# Patient Record
Sex: FEMALE | Race: WHITE | NOT HISPANIC OR LATINO | Employment: OTHER | URBAN - METROPOLITAN AREA
[De-identification: names, ages, dates, MRNs, and addresses within clinical notes are randomized per-mention and may not be internally consistent; named-entity substitution may affect disease eponyms.]

---

## 2023-03-08 LAB
ALANINE AMINOTRANSFERASE (SGPT) (U/L) IN SER/PLAS: 14 U/L (ref 7–45)
ALBUMIN (G/DL) IN SER/PLAS: 4.3 G/DL (ref 3.4–5)
ALKALINE PHOSPHATASE (U/L) IN SER/PLAS: 83 U/L (ref 33–136)
ANION GAP IN SER/PLAS: 11 MMOL/L (ref 10–20)
ASPARTATE AMINOTRANSFERASE (SGOT) (U/L) IN SER/PLAS: 17 U/L (ref 9–39)
BILIRUBIN TOTAL (MG/DL) IN SER/PLAS: 0.7 MG/DL (ref 0–1.2)
CALCIUM (MG/DL) IN SER/PLAS: 9.2 MG/DL (ref 8.6–10.3)
CARBON DIOXIDE, TOTAL (MMOL/L) IN SER/PLAS: 32 MMOL/L (ref 21–32)
CHLORIDE (MMOL/L) IN SER/PLAS: 102 MMOL/L (ref 98–107)
CHOLESTEROL (MG/DL) IN SER/PLAS: 165 MG/DL (ref 0–199)
CHOLESTEROL IN HDL (MG/DL) IN SER/PLAS: 56 MG/DL
CHOLESTEROL/HDL RATIO: 2.9
CREATININE (MG/DL) IN SER/PLAS: 0.82 MG/DL (ref 0.5–1.05)
GFR FEMALE: 78 ML/MIN/1.73M2
GLUCOSE (MG/DL) IN SER/PLAS: 130 MG/DL (ref 74–99)
LDL: 94 MG/DL (ref 0–99)
POTASSIUM (MMOL/L) IN SER/PLAS: 3.9 MMOL/L (ref 3.5–5.3)
PROTEIN TOTAL: 6.8 G/DL (ref 6.4–8.2)
SODIUM (MMOL/L) IN SER/PLAS: 141 MMOL/L (ref 136–145)
TRIGLYCERIDE (MG/DL) IN SER/PLAS: 75 MG/DL (ref 0–149)
UREA NITROGEN (MG/DL) IN SER/PLAS: 15 MG/DL (ref 6–23)
VLDL: 15 MG/DL (ref 0–40)

## 2024-01-07 ENCOUNTER — HOSPITAL ENCOUNTER (OUTPATIENT)
Dept: RADIOLOGY | Facility: HOSPITAL | Age: 69
Discharge: HOME | End: 2024-01-07
Payer: MEDICARE

## 2024-01-07 DIAGNOSIS — J18.9 PNEUMONIA, UNSPECIFIED ORGANISM: ICD-10-CM

## 2024-01-07 PROCEDURE — 71046 X-RAY EXAM CHEST 2 VIEWS: CPT

## 2024-01-07 PROCEDURE — 71046 X-RAY EXAM CHEST 2 VIEWS: CPT | Performed by: RADIOLOGY

## 2024-01-17 PROBLEM — E87.6 HYPOKALEMIA: Status: ACTIVE | Noted: 2024-01-17

## 2024-01-17 PROBLEM — I80.9 PHLEBITIS: Status: ACTIVE | Noted: 2024-01-17

## 2024-01-17 PROBLEM — H81.10 BENIGN PAROXYSMAL POSITIONAL VERTIGO: Status: ACTIVE | Noted: 2024-01-17

## 2024-01-17 PROBLEM — R01.1 CARDIAC MURMUR: Status: ACTIVE | Noted: 2018-10-22

## 2024-01-17 PROBLEM — I65.29 OCCLUSION AND STENOSIS OF UNSPECIFIED CAROTID ARTERY: Status: ACTIVE | Noted: 2023-03-01

## 2024-01-17 PROBLEM — I25.10 CAD (CORONARY ATHEROSCLEROTIC DISEASE): Status: ACTIVE | Noted: 2018-04-16

## 2024-01-17 PROBLEM — E78.2 MIXED HYPERLIPIDEMIA: Status: ACTIVE | Noted: 2024-01-17

## 2024-01-17 PROBLEM — R00.2 PALPITATIONS: Status: ACTIVE | Noted: 2024-01-17

## 2024-01-17 PROBLEM — I65.23 BILATERAL CAROTID ARTERY STENOSIS: Status: ACTIVE | Noted: 2024-01-17

## 2024-01-17 PROBLEM — I10 ESSENTIAL HYPERTENSION: Status: ACTIVE | Noted: 2024-01-17

## 2024-01-17 PROBLEM — I51.7 LVH (LEFT VENTRICULAR HYPERTROPHY): Status: ACTIVE | Noted: 2024-01-17

## 2024-01-17 RX ORDER — LOSARTAN POTASSIUM 50 MG/1
50 TABLET ORAL
COMMUNITY
Start: 2023-11-01 | End: 2024-03-14 | Stop reason: SDUPTHER

## 2024-01-17 RX ORDER — ROSUVASTATIN CALCIUM 40 MG/1
40 TABLET, COATED ORAL DAILY
COMMUNITY
Start: 2023-11-01 | End: 2024-01-30

## 2024-01-17 RX ORDER — ESOMEPRAZOLE MAGNESIUM 40 MG/1
40 CAPSULE, DELAYED RELEASE ORAL
COMMUNITY

## 2024-01-17 RX ORDER — AMITRIPTYLINE HYDROCHLORIDE 10 MG/1
10 TABLET, FILM COATED ORAL NIGHTLY
COMMUNITY
End: 2024-03-14 | Stop reason: WASHOUT

## 2024-01-17 RX ORDER — LANOLIN ALCOHOL/MO/W.PET/CERES
400 CREAM (GRAM) TOPICAL 2 TIMES DAILY
COMMUNITY

## 2024-01-17 RX ORDER — IBUPROFEN 200 MG
200 TABLET ORAL EVERY 6 HOURS
COMMUNITY

## 2024-01-17 RX ORDER — HYDROCHLOROTHIAZIDE 25 MG/1
25 TABLET ORAL DAILY
COMMUNITY
Start: 2022-08-22 | End: 2024-01-30

## 2024-01-17 RX ORDER — PROPRANOLOL HYDROCHLORIDE 60 MG/1
1 CAPSULE, EXTENDED RELEASE ORAL DAILY
COMMUNITY
Start: 2017-09-21 | End: 2024-01-30

## 2024-01-17 RX ORDER — ALBUTEROL SULFATE 90 UG/1
2 AEROSOL, METERED RESPIRATORY (INHALATION) EVERY 6 HOURS PRN
COMMUNITY

## 2024-01-17 RX ORDER — POTASSIUM CHLORIDE 750 MG/1
10 TABLET, FILM COATED, EXTENDED RELEASE ORAL DAILY
COMMUNITY

## 2024-01-17 RX ORDER — NAPROXEN SODIUM 220 MG/1
81 TABLET, FILM COATED ORAL DAILY
COMMUNITY

## 2024-01-17 RX ORDER — MECLIZINE HYDROCHLORIDE 25 MG/1
25 TABLET ORAL 3 TIMES DAILY PRN
COMMUNITY
End: 2024-03-14 | Stop reason: WASHOUT

## 2024-01-17 RX ORDER — SUMATRIPTAN SUCCINATE 100 MG/1
100 TABLET ORAL
COMMUNITY

## 2024-01-30 DIAGNOSIS — E78.2 MIXED HYPERLIPIDEMIA: ICD-10-CM

## 2024-01-30 DIAGNOSIS — I10 ESSENTIAL HYPERTENSION: ICD-10-CM

## 2024-01-30 DIAGNOSIS — I25.10 ATHEROSCLEROSIS OF NATIVE CORONARY ARTERY OF NATIVE HEART WITHOUT ANGINA PECTORIS: ICD-10-CM

## 2024-01-30 RX ORDER — HYDROCHLOROTHIAZIDE 25 MG/1
25 TABLET ORAL DAILY
Qty: 90 TABLET | Refills: 3 | Status: SHIPPED | OUTPATIENT
Start: 2024-01-30 | End: 2024-03-14 | Stop reason: SDUPTHER

## 2024-01-30 RX ORDER — PROPRANOLOL HYDROCHLORIDE 60 MG/1
60 CAPSULE, EXTENDED RELEASE ORAL DAILY
Qty: 90 CAPSULE | Refills: 3 | Status: SHIPPED | OUTPATIENT
Start: 2024-01-30

## 2024-01-30 RX ORDER — ROSUVASTATIN CALCIUM 40 MG/1
TABLET, COATED ORAL
Qty: 90 TABLET | Refills: 3 | Status: SHIPPED | OUTPATIENT
Start: 2024-01-30 | End: 2024-03-14 | Stop reason: SDUPTHER

## 2024-01-30 NOTE — TELEPHONE ENCOUNTER
Received request for prescription refills for patient.   Patient follows with Dr. Dykes, DO     Last OV 3/9/2023  Next OV 3/14/2024    Pended for signing and sent to provider

## 2024-03-09 ENCOUNTER — LAB (OUTPATIENT)
Dept: LAB | Facility: LAB | Age: 69
End: 2024-03-09
Payer: MEDICARE

## 2024-03-09 DIAGNOSIS — I51.7 CARDIOMEGALY: ICD-10-CM

## 2024-03-09 DIAGNOSIS — E78.2 MIXED HYPERLIPIDEMIA: ICD-10-CM

## 2024-03-09 DIAGNOSIS — I10 ESSENTIAL (PRIMARY) HYPERTENSION: Primary | ICD-10-CM

## 2024-03-09 LAB
ALBUMIN SERPL BCP-MCNC: 4.2 G/DL (ref 3.4–5)
ALP SERPL-CCNC: 90 U/L (ref 33–136)
ALT SERPL W P-5'-P-CCNC: 11 U/L (ref 7–45)
ANION GAP SERPL CALC-SCNC: 11 MMOL/L (ref 10–20)
AST SERPL W P-5'-P-CCNC: 13 U/L (ref 9–39)
BILIRUB SERPL-MCNC: 0.6 MG/DL (ref 0–1.2)
BUN SERPL-MCNC: 13 MG/DL (ref 6–23)
CALCIUM SERPL-MCNC: 9.1 MG/DL (ref 8.6–10.3)
CHLORIDE SERPL-SCNC: 102 MMOL/L (ref 98–107)
CHOLEST SERPL-MCNC: 176 MG/DL (ref 0–199)
CHOLESTEROL/HDL RATIO: 3.1
CO2 SERPL-SCNC: 31 MMOL/L (ref 21–32)
CREAT SERPL-MCNC: 0.7 MG/DL (ref 0.5–1.05)
EGFRCR SERPLBLD CKD-EPI 2021: >90 ML/MIN/1.73M*2
GLUCOSE SERPL-MCNC: 110 MG/DL (ref 74–99)
HDLC SERPL-MCNC: 56.2 MG/DL
LDLC SERPL CALC-MCNC: 101 MG/DL
NON HDL CHOLESTEROL: 120 MG/DL (ref 0–149)
POTASSIUM SERPL-SCNC: 3.7 MMOL/L (ref 3.5–5.3)
PROT SERPL-MCNC: 6.5 G/DL (ref 6.4–8.2)
SODIUM SERPL-SCNC: 140 MMOL/L (ref 136–145)
TRIGL SERPL-MCNC: 95 MG/DL (ref 0–149)
VLDL: 19 MG/DL (ref 0–40)

## 2024-03-09 PROCEDURE — 36415 COLL VENOUS BLD VENIPUNCTURE: CPT

## 2024-03-09 PROCEDURE — 80053 COMPREHEN METABOLIC PANEL: CPT

## 2024-03-09 PROCEDURE — 80061 LIPID PANEL: CPT

## 2024-03-14 ENCOUNTER — OFFICE VISIT (OUTPATIENT)
Dept: CARDIOLOGY | Facility: CLINIC | Age: 69
End: 2024-03-14
Payer: MEDICARE

## 2024-03-14 VITALS
HEIGHT: 66 IN | WEIGHT: 191 LBS | HEART RATE: 72 BPM | BODY MASS INDEX: 30.7 KG/M2 | SYSTOLIC BLOOD PRESSURE: 138 MMHG | DIASTOLIC BLOOD PRESSURE: 78 MMHG

## 2024-03-14 DIAGNOSIS — Z78.9 NEVER SMOKED CIGARETTES: ICD-10-CM

## 2024-03-14 DIAGNOSIS — I10 ESSENTIAL HYPERTENSION: Primary | ICD-10-CM

## 2024-03-14 DIAGNOSIS — I65.29 OCCLUSION AND STENOSIS OF UNSPECIFIED CAROTID ARTERY: ICD-10-CM

## 2024-03-14 DIAGNOSIS — E78.2 MIXED HYPERLIPIDEMIA: ICD-10-CM

## 2024-03-14 DIAGNOSIS — I25.10 ATHEROSCLEROSIS OF NATIVE CORONARY ARTERY OF NATIVE HEART WITHOUT ANGINA PECTORIS: ICD-10-CM

## 2024-03-14 DIAGNOSIS — I51.7 LVH (LEFT VENTRICULAR HYPERTROPHY): ICD-10-CM

## 2024-03-14 DIAGNOSIS — I47.19 PAT (PAROXYSMAL ATRIAL TACHYCARDIA) (CMS-HCC): ICD-10-CM

## 2024-03-14 DIAGNOSIS — I65.23 BILATERAL CAROTID ARTERY STENOSIS: ICD-10-CM

## 2024-03-14 PROCEDURE — 1159F MED LIST DOCD IN RCRD: CPT | Performed by: INTERNAL MEDICINE

## 2024-03-14 PROCEDURE — 3008F BODY MASS INDEX DOCD: CPT | Performed by: INTERNAL MEDICINE

## 2024-03-14 PROCEDURE — 3075F SYST BP GE 130 - 139MM HG: CPT | Performed by: INTERNAL MEDICINE

## 2024-03-14 PROCEDURE — 1036F TOBACCO NON-USER: CPT | Performed by: INTERNAL MEDICINE

## 2024-03-14 PROCEDURE — 99214 OFFICE O/P EST MOD 30 MIN: CPT | Performed by: INTERNAL MEDICINE

## 2024-03-14 PROCEDURE — 3078F DIAST BP <80 MM HG: CPT | Performed by: INTERNAL MEDICINE

## 2024-03-14 RX ORDER — ROSUVASTATIN CALCIUM 40 MG/1
40 TABLET, COATED ORAL DAILY
Qty: 90 TABLET | Refills: 3 | Status: SHIPPED | OUTPATIENT
Start: 2024-03-14 | End: 2025-03-14

## 2024-03-14 RX ORDER — LOSARTAN POTASSIUM 50 MG/1
TABLET ORAL
Qty: 135 TABLET | Refills: 3 | Status: SHIPPED | OUTPATIENT
Start: 2024-03-14

## 2024-03-14 RX ORDER — HYDROCHLOROTHIAZIDE 25 MG/1
25 TABLET ORAL DAILY
Qty: 90 TABLET | Refills: 3 | Status: SHIPPED | OUTPATIENT
Start: 2024-03-14

## 2024-03-14 RX ORDER — AMITRIPTYLINE HYDROCHLORIDE 25 MG/1
25 TABLET, FILM COATED ORAL NIGHTLY
COMMUNITY

## 2024-03-14 NOTE — PROGRESS NOTES
CARDIOLOGY OFFICE VISIT      CHIEF COMPLAINT  Chief Complaint   Patient presents with    Follow-up     1 YEAR        HISTORY OF PRESENT ILLNESS  Patient is a 68-year-old  female with past medical history significant for hypercholesterolemia, carotid artery disease, left ventricular hypertrophy, paroxysmal atrial tachycardia who presents for follow-up cardiovascular care.      Patient has mild palpitations 2 times a week lasting seconds in duration.  Denies chest pain, dyspnea, nausea, vomiting.  Chronic mild dizziness without near syncope or mikayla syncope.  Denies edema or claudicate of symptoms.  Patient had bronchitis twice treated with antibiotics and metered-dose inhaler.  She had a right upper lobe 5 mm nodule on chest x-ray and will be following up with [facility] pulmonology.  Patient has not been exercising.        REVIEW OF SYSTEMS: Negative, noncontributory or as previously mentioned x12 systems..      PAST MEDICAL HISTORY  1. Hypercholesterolemia.  2. Phlebitis.  3. Positional vertigo.  4. Depression.  5. Migraine headaches.      PAST SURGICAL HISTORY   1. Cholecystectomy.   2.  x2.   3. Tonsils and adenoidectomy.   4. Heel spur surgery.      FAMILY HISTORY: Mom has a history of cerebrovascular accident at age 55, status  post carotid endarterectomy, coronary artery disease status post angioplasty.      SOCIAL HISTORY: Denies tobacco use. Occasional alcohol use.      ALLERGIES: Contrast media, intolerance to Lipitor.      I personally reviewed EKG 2020: Normal sinus rhythm     ASSESSMENT   Hypertension   Left ventricular hypertrophy  Paroxysmal atrial tachycardia/premature atrial contractions  Hypercholesterolemia.   Fatty liver-declined liver biopsy  Arizmendi's esophagus  Hiatal hernia  Vertigo  Family history of premature atherosclerotic disease.   Contrast allergy.      RECOMMENDATIONS   Patient appears to be stable from cardiac standpoint. No symptoms of angina. No evidence of heart  failure. Blood pressure under good control. Cholesterol mildly elevated, glucose mildly elevated. Emphasized importance of low sodium diet less than 2000 g sodium per day. Recommend exercise program 30 minutes, along with diet and weight loss. Maintain blood pressure diary prior to office visits.  Consider increasing losartan.  Consider adding Zetia. Follow-up in one year. Check Carotid ultrasound,CMP, lipid profile at that time.     Please excuse any errors in grammar or translation related to this dictation. Voice recognition software was utilized to prepare this document.     Recent Cardiovascular Testing:  The following results have been reviewed and updated.   Carotid Duplex 3/1/23  1.SAMIR < 50%-69%  2.LICA <50%-69%     Renal artery ultrasound 8/11/20  1.Bilateral renal arteries no evidence of hemodynamically significant stenosis.     Labs 3/9/24  , TG 95, HDL 56,         24 hr Holter 8/6/20  1.Symptoms of palpitations correlated with NSR  2.Rare PVC, PAC  3.No significant dysrhythmias.     CT Angio coronary arteries 8/11/20  1.Mild diffuce CAD without significante stenosis.  2. Coronary calcium score zero     Abd u/s: 2/16/21  1. Abdominal aorta, no evidence of aneurysm.       VITALS  Vitals:    03/14/24 0708   BP: 138/78   Pulse: 72     Wt Readings from Last 4 Encounters:   03/09/23 85.7 kg (189 lb)   03/10/22 83.9 kg (185 lb)       PHYSICAL EXAM:  GENERAL:  Well developed, well nourished, in no acute distress.  CHEST:  Symmetric and nontender.  NEURO/PSYCH:  Alert and oriented times three with approppriate behavior and responses.  NECK:  Supple, no JVD, no bruit.  LUNGS:  Clear to auscultation bilaterally, normal respiratory effort.  HEART:  Rate and rhythm regular with no evident murmur, no gallop appreciated.                   There are no rubs, clicks or heaves.  EXTREMITIES:  Warm with good color, no clubbing or cyanosis.  There is no edema noted.  PERIPHERAL VASCULAR:  Pulses present and  "equally palpable; 2+ throughout.    ASSESSMENT AND PLAN  Diagnoses and all orders for this visit:  Essential hypertension  -     Comprehensive metabolic panel; Future  -     hydroCHLOROthiazide (HYDRODiuril) 25 mg tablet; Take 1 tablet (25 mg) by mouth once daily.  -     losartan (Cozaar) 50 mg tablet; Take 1 tablet in the morning and 1/2 tablet in the evening  LVH (left ventricular hypertrophy)  -     Comprehensive metabolic panel; Future  PAT (paroxysmal atrial tachycardia)  Mixed hyperlipidemia  -     Lipid panel; Future  -     rosuvastatin (Crestor) 40 mg tablet; Take 1 tablet (40 mg) by mouth once daily.  BMI 30.0-30.9,adult  Never smoked cigarettes  Occlusion and stenosis of unspecified carotid artery  Bilateral carotid artery stenosis  -     Vascular US carotid artery duplex bilateral; Future  Atherosclerosis of native coronary artery of native heart without angina pectoris  -     hydroCHLOROthiazide (HYDRODiuril) 25 mg tablet; Take 1 tablet (25 mg) by mouth once daily.  -     rosuvastatin (Crestor) 40 mg tablet; Take 1 tablet (40 mg) by mouth once daily.      Past Medical History  No past medical history on file.    Social History  Social History     Tobacco Use    Smoking status: Never    Smokeless tobacco: Never   Substance Use Topics    Alcohol use: Yes     Comment: social    Drug use: Never       Family History     Family History   Problem Relation Name Age of Onset    Hypertension Mother      Stroke Mother  55    Other (carotid endarterectomy) Mother      Coronary artery disease Mother      Other (Angioplasty) Mother      Hypertension Father      Heart failure Father      Other (Abdominal aortic aneurysm) Father      Emphysema Father          Allergies:  Allergies   Allergen Reactions    Oxycodone-Acetaminophen Nausea Only, Other and Nausea/vomiting     \"lightheaded\"    Nausea    Atorvastatin Myalgia    Citalopram Unknown and Other    Fluoxetine Unknown    Iodinated Contrast Media Unknown    Serotonin " "5ht-3 Antagonists Other        Outpatient Medications:  Current Outpatient Medications   Medication Instructions    albuterol 90 mcg/actuation inhaler 2 puffs, inhalation, Every 6 hours PRN    Alpha tocopherol (VITAMIN E) 200 Units, oral, Daily    amitriptyline (ELAVIL) 10 mg, oral, Nightly    aspirin 81 mg, oral, Daily    ergocalciferol, vitamin D2, (VITAMIN D2 ORAL) 1 tablet, oral, Daily    esomeprazole (NEXIUM) 40 mg, oral, Daily before breakfast    hydroCHLOROthiazide (HYDRODIURIL) 25 mg, oral, Daily    ibuprofen 200 mg, oral, Every 6 hours    losartan (COZAAR) 50 mg, oral, Take 1 tablet in the morning and 1/2 tablet in the evening    magnesium oxide (MAGOX) 400 mg, oral, 2 times daily    meclizine (ANTIVERT) 25 mg, oral, 3 times daily PRN    potassium chloride CR 10 mEq ER tablet 10 mEq, oral, Daily    propranolol LA (INDERAL LA) 60 mg, oral, Daily    rosuvastatin (Crestor) 40 mg tablet TAKE 1 TABLET DAILY IN THE EVENING (INCREASE IN DOSAGE)    SUMAtriptan (IMITREX) 100 mg, oral, Take 1/2 to 1 tablet at first s/s migraine.  May repeat in 2 hours. Max 200 mg in 24 hours        Recent Lab Results:    CMP:    Lab Results   Component Value Date     03/09/2024    K 3.7 03/09/2024     03/09/2024    CO2 31 03/09/2024    BUN 13 03/09/2024    CREATININE 0.70 03/09/2024    GLUCOSE 110 (H) 03/09/2024    CALCIUM 9.1 03/09/2024       Magnesium:    No results found for: \"MG\"    Lipid Profile:    Lab Results   Component Value Date    TRIG 95 03/09/2024    HDL 56.2 03/09/2024    LDLCALC 101 (H) 03/09/2024       TSH:    Lab Results   Component Value Date    TSH 1.60 07/31/2020       BNP:   No results found for: \"BNP\"     PT/INR:    No results found for: \"PROTIME\", \"INR\"    HgBA1c:    No results found for: \"HGBA1C\"    BMP:  Lab Results   Component Value Date     03/09/2024     03/08/2023     03/10/2022     06/10/2021    K 3.7 03/09/2024    K 3.9 03/08/2023    K 4.0 03/10/2022    K 3.5 " "06/10/2021     03/09/2024     03/08/2023    CL 99 03/10/2022    CL 99 06/10/2021    CO2 31 03/09/2024    CO2 32 03/08/2023    CO2 33 (H) 03/10/2022    CO2 33 (H) 06/10/2021    BUN 13 03/09/2024    BUN 15 03/08/2023    BUN 17 03/10/2022    BUN 17 06/10/2021    CREATININE 0.70 03/09/2024    CREATININE 0.82 03/08/2023    CREATININE 0.76 03/10/2022    CREATININE 0.80 06/10/2021       CBC:  Lab Results   Component Value Date    WBC 7.1 07/31/2020    RBC 4.26 07/31/2020    HGB 13.4 07/31/2020    HCT 39.4 07/31/2020    MCV 92 07/31/2020    MCHC 34.0 07/31/2020    RDW 12.7 07/31/2020     07/31/2020       Cardiac Enzymes:    No results found for: \"TROPHS\"    Hepatic Function Panel:    Lab Results   Component Value Date    ALKPHOS 90 03/09/2024    ALT 11 03/09/2024    AST 13 03/09/2024    PROT 6.5 03/09/2024    BILITOT 0.6 03/09/2024           Chris Dykes, DO        "

## 2024-03-14 NOTE — PATIENT INSTRUCTIONS
BP INSTRUCTIONS GIVEN    CAROTID ULTRASOUND IN 1 YEAR    FOLLOW UP IN 1 YEAR  OBTAIN LAB WORK PRIOR TO THIS VISIT, THIS WILL BE FASTING

## 2025-03-04 ENCOUNTER — TELEPHONE (OUTPATIENT)
Dept: CARDIOLOGY | Facility: CLINIC | Age: 70
End: 2025-03-04
Payer: MEDICARE

## 2025-03-04 NOTE — TELEPHONE ENCOUNTER
Called patient to reschedule Carotid scheduled for 03/05 @ 7:15 am due to it being scheduled in the wrong room. Moved pt to 8:15 am instead. Left  with appointment details.

## 2025-03-05 ENCOUNTER — APPOINTMENT (OUTPATIENT)
Dept: CARDIOLOGY | Facility: CLINIC | Age: 70
End: 2025-03-05
Payer: MEDICARE

## 2025-03-13 ENCOUNTER — APPOINTMENT (OUTPATIENT)
Dept: CARDIOLOGY | Facility: CLINIC | Age: 70
End: 2025-03-13
Payer: MEDICARE

## 2025-03-25 ENCOUNTER — HOSPITAL ENCOUNTER (OUTPATIENT)
Dept: RADIOLOGY | Facility: HOSPITAL | Age: 70
Discharge: HOME | End: 2025-03-25
Payer: MEDICARE

## 2025-03-25 DIAGNOSIS — I65.23 BILATERAL CAROTID ARTERY STENOSIS: ICD-10-CM

## 2025-03-25 PROCEDURE — 93880 EXTRACRANIAL BILAT STUDY: CPT

## 2025-03-27 LAB
ALBUMIN SERPL-MCNC: 4.3 G/DL (ref 3.6–5.1)
ALP SERPL-CCNC: 91 U/L (ref 37–153)
ALT SERPL-CCNC: 11 U/L (ref 6–29)
ANION GAP SERPL CALCULATED.4IONS-SCNC: 8 MMOL/L (CALC) (ref 7–17)
AST SERPL-CCNC: 13 U/L (ref 10–35)
BILIRUB SERPL-MCNC: 0.6 MG/DL (ref 0.2–1.2)
BUN SERPL-MCNC: 11 MG/DL (ref 7–25)
CALCIUM SERPL-MCNC: 9.3 MG/DL (ref 8.6–10.4)
CHLORIDE SERPL-SCNC: 99 MMOL/L (ref 98–110)
CHOLEST SERPL-MCNC: 177 MG/DL
CHOLEST/HDLC SERPL: 2.9 (CALC)
CO2 SERPL-SCNC: 32 MMOL/L (ref 20–32)
CREAT SERPL-MCNC: 0.63 MG/DL (ref 0.5–1.05)
EGFRCR SERPLBLD CKD-EPI 2021: 96 ML/MIN/1.73M2
GLUCOSE SERPL-MCNC: 118 MG/DL (ref 65–99)
HDLC SERPL-MCNC: 61 MG/DL
LDLC SERPL CALC-MCNC: 97 MG/DL (CALC)
NONHDLC SERPL-MCNC: 116 MG/DL (CALC)
POTASSIUM SERPL-SCNC: 4.2 MMOL/L (ref 3.5–5.3)
PROT SERPL-MCNC: 6.5 G/DL (ref 6.1–8.1)
SODIUM SERPL-SCNC: 139 MMOL/L (ref 135–146)
TRIGL SERPL-MCNC: 99 MG/DL

## 2025-03-31 ENCOUNTER — APPOINTMENT (OUTPATIENT)
Dept: CARDIOLOGY | Facility: CLINIC | Age: 70
End: 2025-03-31
Payer: MEDICARE

## 2025-03-31 VITALS
WEIGHT: 193.4 LBS | HEIGHT: 66 IN | SYSTOLIC BLOOD PRESSURE: 130 MMHG | HEART RATE: 65 BPM | BODY MASS INDEX: 31.08 KG/M2 | DIASTOLIC BLOOD PRESSURE: 70 MMHG

## 2025-03-31 DIAGNOSIS — E78.2 MIXED HYPERLIPIDEMIA: ICD-10-CM

## 2025-03-31 DIAGNOSIS — I10 ESSENTIAL HYPERTENSION: ICD-10-CM

## 2025-03-31 DIAGNOSIS — I25.10 ATHEROSCLEROSIS OF NATIVE CORONARY ARTERY OF NATIVE HEART WITHOUT ANGINA PECTORIS: ICD-10-CM

## 2025-03-31 DIAGNOSIS — I51.7 LVH (LEFT VENTRICULAR HYPERTROPHY): ICD-10-CM

## 2025-03-31 DIAGNOSIS — I65.23 BILATERAL CAROTID ARTERY STENOSIS: ICD-10-CM

## 2025-03-31 DIAGNOSIS — Z78.9 NEVER SMOKED CIGARETTES: ICD-10-CM

## 2025-03-31 PROBLEM — I65.29 OCCLUSION AND STENOSIS OF UNSPECIFIED CAROTID ARTERY: Status: RESOLVED | Noted: 2023-03-01 | Resolved: 2025-03-31

## 2025-03-31 PROCEDURE — 1036F TOBACCO NON-USER: CPT | Performed by: INTERNAL MEDICINE

## 2025-03-31 PROCEDURE — 1159F MED LIST DOCD IN RCRD: CPT | Performed by: INTERNAL MEDICINE

## 2025-03-31 PROCEDURE — 3078F DIAST BP <80 MM HG: CPT | Performed by: INTERNAL MEDICINE

## 2025-03-31 PROCEDURE — 3008F BODY MASS INDEX DOCD: CPT | Performed by: INTERNAL MEDICINE

## 2025-03-31 PROCEDURE — 99214 OFFICE O/P EST MOD 30 MIN: CPT | Performed by: INTERNAL MEDICINE

## 2025-03-31 PROCEDURE — 3075F SYST BP GE 130 - 139MM HG: CPT | Performed by: INTERNAL MEDICINE

## 2025-03-31 RX ORDER — ROSUVASTATIN CALCIUM 40 MG/1
40 TABLET, COATED ORAL DAILY
Qty: 90 TABLET | Refills: 3 | Status: SHIPPED | OUTPATIENT
Start: 2025-03-31 | End: 2025-04-01

## 2025-03-31 RX ORDER — LOSARTAN POTASSIUM 50 MG/1
TABLET ORAL
Qty: 135 TABLET | Refills: 3 | Status: SHIPPED | OUTPATIENT
Start: 2025-03-31 | End: 2025-04-01

## 2025-03-31 RX ORDER — PROPRANOLOL HYDROCHLORIDE 60 MG/1
60 CAPSULE, EXTENDED RELEASE ORAL DAILY
Qty: 90 CAPSULE | Refills: 3 | Status: SHIPPED | OUTPATIENT
Start: 2025-03-31

## 2025-03-31 RX ORDER — HYDROCHLOROTHIAZIDE 25 MG/1
25 TABLET ORAL DAILY
Qty: 90 TABLET | Refills: 3 | Status: SHIPPED | OUTPATIENT
Start: 2025-03-31 | End: 2025-04-01

## 2025-03-31 NOTE — PROGRESS NOTES
CARDIOLOGY OFFICE VISIT      CHIEF COMPLAINT  Chief Complaint   Patient presents with    Follow-up     Pt is here today following up after 1 year to review recent labs and vascular duplex        HISTORY OF PRESENT ILLNESS  Patient is a 69-year-old  female with past medical history significant for hypercholesterolemia, carotid artery disease, left ventricular hypertrophy, paroxysmal atrial tachycardia who presents for follow-up cardiovascular care.      Patient had recent influenza A and has had some associated dyspnea.  She has had some relief with metered-dose inhaler.  Denies chest pain, palpitations, nausea, vomiting.  She has a chronic occasional dizziness attributed to vertigo.  Denies near-syncope, mikayla syncope, edema.  No formal exercise program.  Home blood pressure readings are normal.        REVIEW OF SYSTEMS: Negative, noncontributory or as previously mentioned x12 systems..      PAST MEDICAL HISTORY  1. Hypercholesterolemia.  2. Phlebitis.  3. Positional vertigo.  4. Depression.  5. Migraine headaches.      PAST SURGICAL HISTORY   1. Cholecystectomy.   2.  x2.   3. Tonsils and adenoidectomy.   4. Heel spur surgery.      FAMILY HISTORY: Mom has a history of cerebrovascular accident at age 55, status  post carotid endarterectomy, coronary artery disease status post angioplasty.      SOCIAL HISTORY: Denies tobacco use. Occasional alcohol use.      ALLERGIES: Contrast media, intolerance to Lipitor.      I personally reviewed EKG 2020: Normal sinus rhythm     ASSESSMENT   Hypertension   Left ventricular hypertrophy  Paroxysmal atrial tachycardia/premature atrial contractions  Hypercholesterolemia.   Hyperglycemia  Fatty liver-declined liver biopsy  Arizmendi's esophagus  Hiatal hernia  Vertigo  Family history of premature atherosclerotic disease.   Contrast allergy.      RECOMMENDATIONS   Patient appears to be stable from cardiac standpoint. No symptoms of angina. No evidence of  heart failure. Blood pressure under good control. Cholesterol mildly elevated, glucose mildly elevated. Emphasized importance of low sodium diet less than 2000 g sodium per day. Recommend exercise program 30 minutes, along with diet and weight loss. Maintain blood pressure diary prior to office visits.  Follow-up in one year. Check CMP, lipid profile at that time.     Please excuse any errors in grammar or translation related to this dictation. Voice recognition software was utilized to prepare this document.     Recent Cardiovascular Testing:  The following results have been reviewed and updated.   Carotid Duplex 3/25/25  1.Mild bilaterally     Renal artery ultrasound 8/11/20  1.Bilateral renal arteries no evidence of hemodynamically significant stenosis.     Labs 3/27/25  ,HDL 61, LDL 97, Trig 99        24 hr Holter 8/6/20  1.Symptoms of palpitations correlated with NSR  2.Rare PVC, PAC  3.No significant dysrhythmias.     CT Angio coronary arteries 8/11/20  1.Mild diffuce CAD without significante stenosis.  2. Coronary calcium score zero     Abd u/s: 2/16/21  1. Abdominal aorta, no evidence of aneurysm.       VITALS  Vitals:    03/31/25 1451   BP: 130/70   Pulse: 65     Wt Readings from Last 4 Encounters:   03/31/25 87.7 kg (193 lb 6.4 oz)   03/14/24 86.6 kg (191 lb)   03/09/23 85.7 kg (189 lb)   03/10/22 83.9 kg (185 lb)       PHYSICAL EXAM:  GENERAL:  Well developed, well nourished, in no acute distress.  CHEST:  Symmetric and nontender.  NEURO/PSYCH:  Alert and oriented times three with approppriate behavior and responses.  NECK:  Supple, no JVD, no bruit.  LUNGS:  Clear to auscultation bilaterally, normal respiratory effort.  HEART:  Rate and rhythm REGULAR with no evident murmur, no gallop appreciated.    There are no rubs, clicks or heaves.  EXTREMITIES:  Warm with good color, no clubbing or cyanosis.  There is no edema noted.  PERIPHERAL VASCULAR:  Pulses present and equally palpable; 2+  "throughout.    ASSESSMENT AND PLAN  Problem List Items Addressed This Visit          Cardiac and Vasculature    CAD (coronary atherosclerotic disease)    Relevant Medications    hydroCHLOROthiazide (HYDRODiuril) 25 mg tablet    propranolol LA (Inderal LA) 60 mg 24 hr capsule    rosuvastatin (Crestor) 40 mg tablet    Other Relevant Orders    Comprehensive metabolic panel    Essential hypertension    Relevant Medications    hydroCHLOROthiazide (HYDRODiuril) 25 mg tablet    losartan (Cozaar) 50 mg tablet    propranolol LA (Inderal LA) 60 mg 24 hr capsule    LVH (left ventricular hypertrophy)    Relevant Medications    propranolol LA (Inderal LA) 60 mg 24 hr capsule    Mixed hyperlipidemia    Relevant Medications    rosuvastatin (Crestor) 40 mg tablet    Other Relevant Orders    Lipid panel    Bilateral carotid artery stenosis       Endocrine/Metabolic    BMI 31.0-31.9,adult       Tobacco    Never smoked cigarettes       Past Medical History  No past medical history on file.    Social History  Social History     Tobacco Use    Smoking status: Never    Smokeless tobacco: Never   Substance Use Topics    Alcohol use: Yes     Comment: social    Drug use: Never       Family History     Family History   Problem Relation Name Age of Onset    Hypertension Mother      Stroke Mother  55    Other (carotid endarterectomy) Mother      Coronary artery disease Mother      Other (Angioplasty) Mother      Hypertension Father      Heart failure Father      Other (Abdominal aortic aneurysm) Father      Emphysema Father          Allergies:  Allergies   Allergen Reactions    Oxycodone-Acetaminophen Nausea Only, Other and Nausea/vomiting     \"lightheaded\"    Nausea    Atorvastatin Myalgia    Citalopram Unknown and Other    Fluoxetine Unknown    Iodinated Contrast Media Unknown    Serotonin Other     Leg cramps    Serotonin 5ht-3 Antagonists Other        Outpatient Medications:  Current Outpatient Medications   Medication Instructions    " "albuterol 90 mcg/actuation inhaler 2 puffs, Every 6 hours PRN    amitriptyline (ELAVIL) 25 mg, Nightly    aspirin 81 mg, Daily    esomeprazole (NEXIUM) 40 mg, Daily before breakfast    hydroCHLOROthiazide (HYDRODIURIL) 25 mg, oral, Daily    ibuprofen 200 mg, Every 6 hours    losartan (Cozaar) 50 mg tablet Take 1 tablet in the morning and 1/2 tablet in the evening    magnesium oxide (MAGOX) 400 mg, 2 times daily    propranolol LA (INDERAL LA) 60 mg, oral, Daily    rosuvastatin (CRESTOR) 40 mg, oral, Daily    SUMAtriptan (IMITREX) 100 mg        Recent Lab Results:    CMP:    Lab Results   Component Value Date     03/27/2025    K 4.2 03/27/2025    CL 99 03/27/2025    CO2 32 03/27/2025    BUN 11 03/27/2025    CREATININE 0.63 03/27/2025    GLUCOSE 118 (H) 03/27/2025    CALCIUM 9.3 03/27/2025       Magnesium:    No results found for: \"MG\"    Lipid Profile:    Lab Results   Component Value Date    TRIG 99 03/27/2025    HDL 61 03/27/2025    LDLCALC 97 03/27/2025       TSH:    Lab Results   Component Value Date    TSH 1.60 07/31/2020       BNP:   No results found for: \"BNP\"     PT/INR:    No results found for: \"PROTIME\", \"INR\"    HgBA1c:    No results found for: \"HGBA1C\"    BMP:  Lab Results   Component Value Date     03/27/2025     03/09/2024     03/08/2023     03/10/2022     06/10/2021    K 4.2 03/27/2025    K 3.7 03/09/2024    K 3.9 03/08/2023    K 4.0 03/10/2022    K 3.5 06/10/2021    CL 99 03/27/2025     03/09/2024     03/08/2023    CL 99 03/10/2022    CL 99 06/10/2021    CO2 32 03/27/2025    CO2 31 03/09/2024    CO2 32 03/08/2023    CO2 33 (H) 03/10/2022    CO2 33 (H) 06/10/2021    BUN 11 03/27/2025    BUN 13 03/09/2024    BUN 15 03/08/2023    BUN 17 03/10/2022    BUN 17 06/10/2021    CREATININE 0.63 03/27/2025    CREATININE 0.70 03/09/2024    CREATININE 0.82 03/08/2023    CREATININE 0.76 03/10/2022    CREATININE 0.80 06/10/2021       CBC:  Lab Results   Component Value " "Date    WBC 7.1 07/31/2020    RBC 4.26 07/31/2020    HGB 13.4 07/31/2020    HCT 39.4 07/31/2020    MCV 92 07/31/2020    MCHC 34.0 07/31/2020    RDW 12.7 07/31/2020     07/31/2020       Cardiac Enzymes:    No results found for: \"TROPHS\"    Hepatic Function Panel:    Lab Results   Component Value Date    ALKPHOS 91 03/27/2025    ALT 11 03/27/2025    AST 13 03/27/2025    PROT 6.5 03/27/2025    BILITOT 0.6 03/27/2025         I,Gerry Rodriges LPN     am scribing for, and in the presence of Dr. Chris Dykes DO   .    I, Dr. Chris Dykes DO   , personally performed the services described in the documentation as scribed by Gerry Rodriges LPN   in my presence, and confirm it is both accurate and complete.      Dr. Chris Dykes DO  Thank you for allowing me to participate in the care of this patient. Please do not hesitate to contact me with any further questions or concerns.          "

## 2025-03-31 NOTE — PATIENT INSTRUCTIONS
Continue same medications and treatments.   Patient educated on proper medication use.   Patient educated on risk factor modification.   Please bring any lab results from other providers / physicians to your next appointment.     Please bring all medicines, vitamins, and herbal supplements with you when you come to the office.     Prescriptions will not be filled unless you are compliant with your follow up appointments or have a follow up appointment scheduled as per instruction of your physician. Refills should be requested at the time of your visit.  CMP and Lipid  1 year visit

## 2025-04-01 DIAGNOSIS — I10 ESSENTIAL HYPERTENSION: ICD-10-CM

## 2025-04-01 DIAGNOSIS — E78.2 MIXED HYPERLIPIDEMIA: ICD-10-CM

## 2025-04-01 DIAGNOSIS — I25.10 ATHEROSCLEROSIS OF NATIVE CORONARY ARTERY OF NATIVE HEART WITHOUT ANGINA PECTORIS: ICD-10-CM

## 2025-04-01 RX ORDER — ROSUVASTATIN CALCIUM 40 MG/1
40 TABLET, COATED ORAL DAILY
Qty: 90 TABLET | Refills: 3 | Status: SHIPPED | OUTPATIENT
Start: 2025-04-01 | End: 2026-04-01

## 2025-04-01 RX ORDER — LOSARTAN POTASSIUM 50 MG/1
TABLET ORAL
Qty: 135 TABLET | Refills: 3 | Status: SHIPPED | OUTPATIENT
Start: 2025-04-01

## 2025-04-01 RX ORDER — HYDROCHLOROTHIAZIDE 25 MG/1
25 TABLET ORAL DAILY
Qty: 90 TABLET | Refills: 3 | Status: SHIPPED | OUTPATIENT
Start: 2025-04-01 | End: 2026-04-01

## 2025-04-01 NOTE — TELEPHONE ENCOUNTER
Received request for prescription refills for patient.   Patient follows with Dr. Dykes    Request is for hydrochlorothiazide, cozaar, and crestor  Is patient currently on medication yes    Last OV 3/31/2025  Next OV 3/26/2026    Pended for signing and sent to provider

## 2026-03-26 ENCOUNTER — APPOINTMENT (OUTPATIENT)
Dept: CARDIOLOGY | Facility: CLINIC | Age: 71
End: 2026-03-26
Payer: MEDICARE